# Patient Record
Sex: FEMALE | Race: WHITE | NOT HISPANIC OR LATINO | URBAN - NONMETROPOLITAN AREA
[De-identification: names, ages, dates, MRNs, and addresses within clinical notes are randomized per-mention and may not be internally consistent; named-entity substitution may affect disease eponyms.]

---
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
## 2023-08-02 NOTE — PROCEDURE: PATIENT SPECIFIC COUNSELING
Render In Strict Bullet Format?: Yes
Detail Level: Zone
Plan: Discussed today presentation consistent with atopic dermatitis with PIH. No evidence of vitiligo but will continue to monitor given family hx.\\n\\n1. Apply hydrocortisone oint or fluocinolone oil to affected areas on legs BID as needed for raised, red, itchy areas\\n2. Continue Cetaphil cream as moisturizer\\n3. Provided atopic derm handouts with recs for bathing, medication use, triggers to avoid, etc  \\n4. Follow up if worsening or not improving with treatment
Size measuring at 6 x 20mm. Recommend patient doing an excision on the nevus sebaceous.\\n\\nDiscussed slight risk of development of skin cancer over lifetime. Patient reassured highly unlikely for skin ca to develop at her age, she was quite worried.
Detail Level: Simple